# Patient Record
Sex: MALE | Race: WHITE | HISPANIC OR LATINO | URBAN - METROPOLITAN AREA
[De-identification: names, ages, dates, MRNs, and addresses within clinical notes are randomized per-mention and may not be internally consistent; named-entity substitution may affect disease eponyms.]

---

## 2023-12-11 ENCOUNTER — OFFICE VISIT (OUTPATIENT)
Dept: URGENT CARE | Facility: CLINIC | Age: 53
End: 2023-12-11
Payer: COMMERCIAL

## 2023-12-11 VITALS
WEIGHT: 197 LBS | OXYGEN SATURATION: 95 % | TEMPERATURE: 96.5 F | HEART RATE: 88 BPM | BODY MASS INDEX: 28.2 KG/M2 | RESPIRATION RATE: 18 BRPM | HEIGHT: 70 IN

## 2023-12-11 DIAGNOSIS — J02.9 SORE THROAT: Primary | ICD-10-CM

## 2023-12-11 DIAGNOSIS — R61 NIGHT SWEATS: ICD-10-CM

## 2023-12-11 LAB — S PYO AG THROAT QL: NEGATIVE

## 2023-12-11 PROCEDURE — 99203 OFFICE O/P NEW LOW 30 MIN: CPT | Performed by: PHYSICIAN ASSISTANT

## 2023-12-11 PROCEDURE — 87880 STREP A ASSAY W/OPTIC: CPT | Performed by: PHYSICIAN ASSISTANT

## 2023-12-11 RX ORDER — PREDNISONE 20 MG/1
20 TABLET ORAL DAILY
Qty: 5 TABLET | Refills: 0 | Status: SHIPPED | OUTPATIENT
Start: 2023-12-11 | End: 2023-12-16

## 2023-12-11 NOTE — PROGRESS NOTES
[facility]        NAME: Zayra Owusu is a 52 y.o. adult  : 1970    MRN: [id number]  DATE: 2023  TIME: 9:50 AM    Assessment and Plan   Sore throat [J02.9]  1. Sore throat  POCT rapid strepA    predniSONE 20 mg tablet      2. Night sweats          POCT Strep negative   Discussed with patient that if sore throat and night sweats continue to persist, he should follow up with his PCP for further testing     Patient Instructions       Follow up with PCP in 3-5 days. Proceed to  ER if symptoms worsen. Chief Complaint     Chief Complaint   Patient presents with   • Cold Like Symptoms     Patient reported that he developed sore throat, chills and night sweats two weeks ago. Patient reported his granddaughter was sick a few weeks ago now and seemed to get better on her own. Patient thought strep however nothing confirmed. Patient taking Mucinex OTC prn AND drinking herbal tea. History of Present Illness       Patient is a 53 yo male who presents for evaluation of sore throat x 2 weeks. He also reports chills and nigh sweats over this period. He states his granddaughter had something similar before him which resolved on its own without treatment. He denies fevers, dysphagia, rashes, arthralgias, myalgias, abdominal pain, n/v/d. Review of Systems   Review of Systems   Constitutional:  Positive for chills. Negative for fever. HENT:  Positive for sore throat. Negative for trouble swallowing and voice change. Gastrointestinal:  Negative for abdominal pain, diarrhea, nausea and vomiting. Musculoskeletal:  Negative for arthralgias and myalgias. Skin:  Negative for rash.          Current Medications       Current Outpatient Medications:   •  predniSONE 20 mg tablet, Take 1 tablet (20 mg total) by mouth daily for 5 days, Disp: 5 tablet, Rfl: 0    Current Allergies     Allergies as of 2023   • (No Known Allergies)            The following portions of the patient's history were reviewed and updated as appropriate: allergies, current medications, past family history, past medical history, past social history, past surgical history and problem list.     Past Medical History:   Diagnosis Date   • Chronic back pain        No past surgical history on file. No family history on file. Medications have been verified. Objective   Pulse 88   Temp (!) 96.5 °F (35.8 °C) (Tympanic)   Resp 18   Ht 5' 10" (1.778 m)   Wt 89.4 kg (197 lb)   SpO2 95%   BMI 28.27 kg/m²        Physical Exam     Physical Exam  Constitutional:       General: He is not in acute distress. Appearance: He is not toxic-appearing. HENT:      Mouth/Throat:      Comments: No throat erythema, edema, or exudates. Tonsils normal   Cardiovascular:      Rate and Rhythm: Normal rate. Heart sounds: Normal heart sounds. Pulmonary:      Effort: Pulmonary effort is normal.      Breath sounds: Normal breath sounds. Abdominal:      Palpations: Abdomen is soft. There is no mass. Tenderness: There is no abdominal tenderness. Comments: No splenomegaly or LUQ tenderness    Neurological:      Mental Status: He is alert.

## 2023-12-11 NOTE — PATIENT INSTRUCTIONS
Your Strep test was negative  Take Prednisone as prescribed   Increase fluids   If your symptoms continues to not improve, please follow up with your PCP